# Patient Record
Sex: MALE | Race: WHITE | Employment: OTHER | URBAN - METROPOLITAN AREA
[De-identification: names, ages, dates, MRNs, and addresses within clinical notes are randomized per-mention and may not be internally consistent; named-entity substitution may affect disease eponyms.]

---

## 2017-01-11 ENCOUNTER — HOSPITAL ENCOUNTER (OUTPATIENT)
Dept: LAB | Age: 72
Discharge: HOME | End: 2017-01-11
Payer: MEDICARE

## 2017-01-11 PROCEDURE — 88304 TISSUE EXAM BY PATHOLOGIST: CPT | Performed by: PODIATRIST

## 2017-01-11 PROCEDURE — 88311 DECALCIFY TISSUE: CPT | Performed by: PODIATRIST

## 2017-03-03 ENCOUNTER — HOSPITAL ENCOUNTER (OUTPATIENT)
Dept: PHYSICAL THERAPY | Age: 72
Discharge: HOME | End: 2017-03-03
Payer: MEDICARE

## 2017-03-03 PROCEDURE — 97161 PT EVAL LOW COMPLEX 20 MIN: CPT

## 2017-03-03 PROCEDURE — G8979 MOBILITY GOAL STATUS: HCPCS

## 2017-03-03 PROCEDURE — 97110 THERAPEUTIC EXERCISES: CPT

## 2017-03-03 PROCEDURE — G8978 MOBILITY CURRENT STATUS: HCPCS

## 2017-03-03 PROCEDURE — 97140 MANUAL THERAPY 1/> REGIONS: CPT

## 2017-03-03 NOTE — PROGRESS NOTES
[facility] at [address] [place] [address]  Phone: [phone]   Fax: [phone]    Plan of Care/ Statement of Necessity for Physical Therapy Services    Patient name: Marley Chavez Start of Care: 3/3/2017   Referral source: Taylor AnilLORENADENNY : 1945    Medical Diagnosis: Foot pain, right [M79.671]   Onset Date:17 (DOS)    Treatment Diagnosis: right foot pain s/p bunionectomy and metatarsocuneiform joint fusion   Prior Hospitalization: see medical history Provider#: [id number]   Medications: Verified on Patient summary List    Comorbidities: HTN, depression, hearing impaired   Prior Level of Function: chronic foot pain with ADL's      The Plan of Care and following information is based on the information from the initial evaluation. Assessment/ key information: Pt is a 72 yo male presenting to clinic s/p foot surgery as described above. Pt currently ambulating in immobilizer boot. On exam, pt has increased right ankle/foot edema, decreased right ankle and great toe A/PROM, decreased right ankle/toe strength, decreased gastroc flexibility and decreased ambulatory status secondary to post op status.      Evaluation Complexity History LOW Complexity : Zero comorbidities / personal factors that will impact the outcome / POC; Examination HIGH Complexity : 4+ Standardized tests and measures addressing body structure, function, activity limitation and / or participation in recreation  ;Presentation LOW Complexity : Stable, uncomplicated  ;Clinical Decision Making MEDIUM Complexity : FOTO score of 26-74  Overall Complexity Rating: LOW   Problem List: pain affecting function, decrease ROM, decrease strength, edema affecting function, impaired gait/ balance, decrease ADL/ functional abilitiies, decrease activity tolerance and decrease flexibility/ joint mobility   Treatment Plan may include any combination of the following: Therapeutic exercise, Therapeutic activities, Neuromuscular re-education, Physical agent/modality, Manual therapy and Patient education  Patient / Family readiness to learn indicated by: asking questions, trying to perform skills and interest  Persons(s) to be included in education: patient (P)  Barriers to Learning/Limitations: None  Patient Goal (s): flexibility  Patient Self Reported Health Status: good  Rehabilitation Potential: good    Short Term Goals: To be accomplished in 2 weeks:  1. Patient will be independent and compliant with HEP to achieve other goals. Status at eval: not independent/compliant  2. Increase right ankle AROM by >/= 10 degrees all planes to normalize function. Status at eval DF: 4, PF: 32, inversion: 24, eversion: 10    Long Term Goals: To be accomplished in 4 weeks:  1. Improve FOTO score to >/= 59/100 to indicate decreased pain with ADL's. Status at eval: 55/100  2. Pt will transition out of boot into normal shoewear on consistent daily basis with normalizing gait. Status at eval: ambulating in immobilizer boot on consistent daily basis  3. Increase right great toe AROM flex and ext by >/= 10 degrees each to normalize gait. Status at eval: ext: 40, flex: 25    Frequency / Duration: Patient to be seen 2 times per week for 4 weeks. Patient/ Caregiver education and instruction: Diagnosis, prognosis, exercises   [x]  Plan of care has been reviewed with PTA    G-Codes (GP)  Mobility   Current  CK= 40-59%   Goal  CK= 40-59%    The severity rating is based on clinical judgment and the FOTO score. Certification Period: 3/317 - 4/1/17  Pearl Cameron, PT 3/3/2017 12:54 PM  _____________________________________________________________________  I certify that the above Therapy Services are being furnished while the patient is under my care. I agree with the treatment plan and certify that this therapy is necessary.     Physician's Signature:____________________  Date:__________Time:______    Please sign and return to   [facility] at [address] [place] [address]  Phone: [phone]   Fax: [phone]

## 2017-03-03 NOTE — PROGRESS NOTES
PT DAILY TREATMENT NOTE - [facility]     Patient Name: Leslye Medina  Date:3/3/2017  : 1945  [x]  Patient  Verified  Payor: Pallavi Parson / Plan: [place] MED[place] & B / Product Type: Medicare /    In time:110  Out time:155  Total Treatment Time (min): 45  Total Timed Codes (min): 23  1:1 Treatment Time ( [address] only): 45   Visit #: 1 of 8    Treatment Area:  Foot pain, right [M79.361]    SUBJECTIVE  Pain Level (0-10 scale): 1/10 seated at rest  Any medication changes, allergies to medications, adverse drug reactions, diagnosis change, or new procedure performed?: [x] No    [] Yes (see summary sheet for update)  Subjective functional status/changes:   [] No changes reported  See POC    OBJECTIVE    Modality rationale:    Min Type Additional Details    [] Estim:  []Unatt       []IFC  []Premod                        []Other:  []w/ice   []w/heat  Position:  Location:    [] Estim: []Att    []TENS instruct  []NMES                    []Other:  []w/US   []w/ice   []w/heat  Position:  Location:    []  Traction: [] Cervical       []Lumbar                       [] Prone          []Supine                       []Intermittent   []Continuous Lbs:  [] before manual  [] after manual    []  Ultrasound: []Continuous   [] Pulsed                           []1MHz   []3MHz W/cm2:  Location:    []  Iontophoresis with dexamethasone         Location: [] Take home patch   [] In clinic    []  Ice     []  heat  []  Ice massage  []  Laser   []  Anodyne Position:  Location:    []  Laser with stim  []  Other:  Position:  Location:    []  Vasopneumatic Device Pressure:       [] lo [] med [] hi   Temperature: [] lo [] med [] hi   [] Skin assessment post-treatment:  []intact []redness- no adverse reaction    []redness  adverse reaction:     22 min [x]Eval                  []Re-Eval       8 min Therapeutic Exercise:  [x] See flow sheet : issued and reviewed HEP   Rationale: increase ROM to improve the patients ability to in prep for ambulation out of boot     min Therapeutic Activity:  []  See flow sheet :         min Neuromuscular Re-education:  []  See flow sheet :       15 min Manual Therapy:  Retrograde massage to right ankle/foot, scar massage, PROM ankle in H/L supine   Rationale: decrease pain, increase ROM, increase tissue extensibility and decrease edema  to in prep for amb out of boot     min Gait Training:  ___ feet with ___ device on level surfaces with ___ level of assist   Rationale: With   [] TE   [] TA   [] neuro   [] other: Patient Education: [x] Review HEP    [] Progressed/Changed HEP based on:   [] positioning   [] body mechanics   [] transfers   [] heat/ice application    [] other:      Other Objective/Functional Measures:     Physical Therapy Evaluation  - Foot and Ankle  Pt s/p right bunionectomy and first metatarsocuneiform joint fusion on 1/11/17 secondary to chronic right foot pain. Pt currently ambulating WBAT right LE in immobilizer boot.   He was initially NWB x 4 weeks and then transitioned to immobolizer boot  PLOF: chronic right foot pain with ADL's  Present Functional Limitations: standing, walking, climbing stairs, driving    Gait: [] Normal    [x] Abnormal    [x] Antalgic    [] NWB    Device: immbolizer boot  Describe:    ROM/Strength  [] Unable to assess at this time      AROM        PROM            Strength (1-5)   Left Right Left Right Left  Right   Dorsiflexsion 17 4 20 10     Plantarflexsion 50 32       Inversion 50 24       Eversion 20 10       Great Toe Ext 55 40 75 55     Great Toe Flex 60 25         Flexibility: [] Unable to assess at this time  Gastroc:    (L) Tightness [] WNL   [x] Min   [x] Mod   [] Severe  10 degrees   (R) Tightness [] WNL   [] Min   [x] Mod   [x] Severe  4 degrees  Soleus:    (L) Tightness [] WNL   [] Min   [] Mod   [] Severe    (R) Tightness [] WNL   [] Min   [] Mod   [] Severe  Other:      (L) Tightness [] WNL   [] Min   [] Mod   [] Severe    (R) Tightness [] WNL   [] Min   [] Mod [] Severe    Palpation:   Location:  Patient's Pain Response: [] Min   [] Mod   [] Severe  Location:  Patient's Pain Response: [] Min   [] Mod   [] Severe    Optional Tests:  Balance/Stork Test: touches/60sec (L): (R):    Single Leg Hop:   (L) Distance(ft):  (R) Distance(ft):  (L)/(R)%:   (L) Time(sec):  (R) Time(sec): (L)/(R)%:      Sub-talor alignment: [] Neutral     [] Pronation      [] Supination    Forefoot alignment:  [] Neutral     [] Varus            [] Valgus    Swelling:   Left (cm) Right (cm)   Figure 8: 23.3 24   Midfoot:      Malleoli Level: 25 26   MTH:        Anterior Drawer: [] Neg    [] Pos  Posterior Drawer:  [] Neg    [] Pos  Inversion Stress:  [] Neg    [] Pos  Talar Tilt:   [] Neg    [] Pos  Eversion Stress:  [] Neg    [] Pos  Rubin's Sign:  [] Neg    [] Pos  Ramirez Test: [] Neg    [] Pos    Other tests/ comments:         Pain Level (0-10 scale) post treatment: 1/10    ASSESSMENT/Changes in Function: see POC    Patient will continue to benefit from skilled PT services to modify and progress therapeutic interventions, address functional mobility deficits, address ROM deficits, address strength deficits, analyze and address soft tissue restrictions, analyze and cue movement patterns and assess and modify postural abnormalities to attain remaining goals.      [x]  See Plan of Care  []  See progress note/recertification  []  See Discharge Summary         Progress towards goals / Updated goals:  See POC    PLAN  [x]  Upgrade activities as tolerated     [x]  Continue plan of care  []  Update interventions per flow sheet       []  Discharge due to:_  [x]  Other: ROM/stretching, strengthening, mod prn, manual techniques, gait      Ed Durham PT 3/3/2017  12:54 PM    Future Appointments  Date Time Provider Cyrus Hina   3/3/2017 1:00 PM Ed Durham PT [facility]

## 2017-03-07 ENCOUNTER — HOSPITAL ENCOUNTER (OUTPATIENT)
Dept: PHYSICAL THERAPY | Age: 72
Discharge: HOME | End: 2017-03-07
Payer: MEDICARE

## 2017-03-07 PROCEDURE — 97112 NEUROMUSCULAR REEDUCATION: CPT

## 2017-03-07 PROCEDURE — 97110 THERAPEUTIC EXERCISES: CPT

## 2017-03-07 PROCEDURE — 97016 VASOPNEUMATIC DEVICE THERAPY: CPT

## 2017-03-07 NOTE — PROGRESS NOTES
PT DAILY TREATMENT NOTE - [facility]     Patient Name: Rand Landa  Date:3/7/2017  : 1945  [x]  Patient  Verified  Payor: [place] MEDICARE / Plan: [place] MEDICARE PART A & B / Product Type: Medicare /    In [id number]  Out time:1020  Total Treatment Time (min): 47  Total Timed Codes (min): 37  1:1 Treatment Time ( [address] only): 30   Visit #: 2 of 8    Treatment Area: Foot pain, right [M22.201]    SUBJECTIVE  Pain Level (0-10 scale): 0/10  Any medication changes, allergies to medications, adverse drug reactions, diagnosis change, or new procedure performed?: [x] No    [] Yes (see summary sheet for update)  Subjective functional status/changes:   [] No changes reported  I have been doing contrast baths at home 3 times a day. Reviewed eval prior to tx.   OBJECTIVE    Modality rationale: decrease edema, decrease inflammation and decrease pain to improve the patients ability to improve ADL's   Min Type Additional Details    [] Estim:  []Unatt       []IFC  []Premod                        []Other:  []w/ice   []w/heat  Position:  Location:    [] Estim: []Att    []TENS instruct  []NMES                    []Other:  []w/US   []w/ice   []w/heat  Position:  Location:    []  Traction: [] Cervical       []Lumbar                       [] Prone          []Supine                       []Intermittent   []Continuous Lbs:  [] before manual  [] after manual    []  Ultrasound: []Continuous   [] Pulsed                           []1MHz   []3MHz W/cm2:  Location:    []  Iontophoresis with dexamethasone         Location: [] Take home patch   [] In clinic    []  Ice     []  heat  []  Ice massage  []  Laser   []  Anodyne Position:  Location:    []  Laser with stim  []  Other:  Position:  Location:   10 [x]  Vasopneumatic Device right foot Pressure:       [] lo [x] med [] hi   Temperature: [x] lo [] med [] hi   [x] Skin assessment post-treatment:  [x]intact []redness- no adverse reaction    []redness  adverse reaction:      min []Eval []Re-Eval       14 min Therapeutic Exercise:  [x] See flow sheet :   Rationale: increase ROM and increase strength to improve the patients ability to ready for weaning out of boot. min Therapeutic Activity:  []  See flow sheet :        13 min Neuromuscular Re-education:  [x]  See flow sheet :   Rationale: increase ROM, increase strength, improve coordination and increase proprioception  to improve the patients ability to improve ADL\"s and ready for weaning out of boot. 10 min Manual Therapy: Retrograde massage to right ankle/foot, scar massage, PROM ankle in H/L supine    Rationale: increase ROM and increase tissue extensibility to improve ADL's     min Gait Training:  ___ feet with ___ device on level surfaces with ___ level of assist   Rationale: With   [] TE   [] TA   [] neuro   [] other: Patient Education: [x] Review HEP    [] Progressed/Changed HEP based on:   [] positioning   [] body mechanics   [] transfers   [x] heat/ice application    [] other:      Other Objective/Functional Measures:      Pain Level (0-10 scale) post treatment: 0/10    ASSESSMENT/Changes in Function: No new changes. Pt wearing boot for all ambulation and utilizing compression hose for edema. Patient will continue to benefit from skilled PT services to modify and progress therapeutic interventions, address functional mobility deficits, address ROM deficits, address strength deficits, analyze and address soft tissue restrictions and analyze and cue movement patterns to attain remaining goals. [x]  See Plan of Care  []  See progress note/recertification  []  See Discharge Summary         Progress towards goals / Updated goals:  Short Term Goals: To be accomplished in 2 weeks:  1. Patient will be independent and compliant with HEP to achieve other goals. Status at eval: not independent/compliant  Current: met 3/07/17    2. Increase right ankle AROM by >/= 10 degrees all planes to normalize function.   Status at eval DF: 4, PF: 32, inversion: 24, eversion: 10  Current: no change     Long Term Goals: To be accomplished in 4 weeks:  1. Improve FOTO score to >/= 59/100 to indicate decreased pain with ADL's. Status at eval: 55/100  Current: retest every 5 visits    2. Pt will transition out of boot into normal shoewear on consistent daily basis with normalizing gait. Status at eval: ambulating in immobilizer boot on consistent daily basis  Current: wearing boot for all ambulation    3. Increase right great toe AROM flex and ext by >/= 10 degrees each to normalize gait.   Status at eval: ext: 40, flex: 25  Current: no change       PLAN  []  Upgrade activities as tolerated     [x]  Continue plan of care  []  Update interventions per flow sheet       []  Discharge due to:_  []  Other:_      Mariano Cullen PTA 3/7/2017  9:44 AM    Future Appointments  Date Time Provider Cyrus Santamaria   3/9/2017 1:30 PM SNEHA Thompson [facility]   3/14/2017 9:30 AM SNEHA Thompson [facility]   3/16/2017 9:30 AM SNEHA Thompson [facility]   3/21/2017 10:30 AM SNEHA Thompson [facility]   3/23/2017 10:00 AM IRENA Peacock [facility]   3/28/2017 9:00 AM SNEHA Thompson [facility]   3/30/2017 10:00 AM IRENA PeacockHPTSTEWART [facility]

## 2017-03-09 ENCOUNTER — HOSPITAL ENCOUNTER (OUTPATIENT)
Dept: PHYSICAL THERAPY | Age: 72
Discharge: HOME | End: 2017-03-09
Payer: MEDICARE

## 2017-03-09 PROCEDURE — 97112 NEUROMUSCULAR REEDUCATION: CPT

## 2017-03-09 PROCEDURE — 97110 THERAPEUTIC EXERCISES: CPT

## 2017-03-09 PROCEDURE — 97016 VASOPNEUMATIC DEVICE THERAPY: CPT

## 2017-03-09 PROCEDURE — 97140 MANUAL THERAPY 1/> REGIONS: CPT

## 2017-03-09 NOTE — PROGRESS NOTES
PT DAILY TREATMENT NOTE - [facility]     Patient Name: Geri Hilton  Date:3/9/2017  : 1945  [x]  Patient  Verified  Payor: Dilip Lance / Plan: [place] MEDICARE PART A & B / Product Type: Medicare /    In [id number]  Out time:1422  Total Treatment Time (min): 50  Total Timed Codes (min): 40  1:1 Treatment Time ( [address] only): 40   Visit #: 3 of 8    Treatment Area: Foot pain, right [M79.821]    SUBJECTIVE  Pain Level (0-10 scale): 0/10  Any medication changes, allergies to medications, adverse drug reactions, diagnosis change, or new procedure performed?: [x] No    [] Yes (see summary sheet for update)  Subjective functional status/changes:   [] No changes reported  Very little pain for the most part.     OBJECTIVE    Modality rationale: decrease edema, decrease inflammation and decrease pain to improve the patients ability to improve ADL's   Min Type Additional Details    [] Estim:  []Unatt       []IFC  []Premod                        []Other:  []w/ice   []w/heat  Position:  Location:    [] Estim: []Att    []TENS instruct  []NMES                    []Other:  []w/US   []w/ice   []w/heat  Position:  Location:    []  Traction: [] Cervical       []Lumbar                       [] Prone          []Supine                       []Intermittent   []Continuous Lbs:  [] before manual  [] after manual    []  Ultrasound: []Continuous   [] Pulsed                           []1MHz   []3MHz W/cm2:  Location:    []  Iontophoresis with dexamethasone         Location: [] Take home patch   [] In clinic    []  Ice     []  heat  []  Ice massage  []  Laser   []  Anodyne Position:  Location:    []  Laser with stim  []  Other:  Position:  Location:   10 [x]  Vasopneumatic Device right foot Pressure:       [] lo [x] med [] hi   Temperature: [x] lo [] med [] hi   [x] Skin assessment post-treatment:  [x]intact []redness- no adverse reaction    []redness  adverse reaction:      min []Eval                  []Re-Eval       16 min Therapeutic Exercise:  [x] See flow sheet :   Rationale: increase ROM, increase strength and improve coordination to improve the patients ability to improve gait and function     min Therapeutic Activity:  []  See flow sheet :        14 min Neuromuscular Re-education:  [x]  See flow sheet :   Rationale: increase ROM, increase strength, improve coordination and increase proprioception  to improve the patients ability to improve ADL's and ready for gait without boot. 8 min Manual Therapy:  Scar massage, PROM flexion and extension of great toe    Rationale: to improve mobility and address edema to improve gait and ADL's     min Gait Training:  ___ feet with ___ device on level surfaces with ___ level of assist   Rationale: With   [] TE   [] TA   [] neuro   [] other: Patient Education: [x] Review HEP    [] Progressed/Changed HEP based on:   [] positioning   [] body mechanics   [] transfers   [] heat/ice application    [] other:      Other Objective/Functional Measures:      Pain Level (0-10 scale) post treatment: 0/10    ASSESSMENT/Changes in Function: Edema improved today with pt wearing boot with all ambulation     Patient will continue to benefit from skilled PT services to modify and progress therapeutic interventions, address functional mobility deficits, address ROM deficits, address strength deficits, analyze and address soft tissue restrictions, analyze and cue movement patterns, analyze and modify body mechanics/ergonomics, assess and modify postural abnormalities, address imbalance/dizziness and instruct in home and community integration to attain remaining goals. [x]  See Plan of Care  []  See progress note/recertification  []  See Discharge Summary           PLAN  []  Upgrade activities as tolerated     [x]  Continue plan of care  []  Update interventions per flow sheet       []  Discharge due to:_  [x]  Other:Pt to wean off boot next week. _      Mathieu Recinos, PTA 3/9/2017  1:36 PM    Future Appointments  Date Time Provider Cyrus Santamaria   3/14/2017 9:30 AM Natalee Wright PTA [facility]   3/16/2017 9:30 AM S[facility]HA [facility]   3/21/2017 10:30 AM [facility]HA [facility]   3/23/2017 10:00 AM Marlene Pearson PT [facility]   3/28/2017 9:00 AM Natalee Wright PTA [facility]   3/30/2017 10:00 AM Marlene Pearson PT [facility]

## 2017-03-14 ENCOUNTER — HOSPITAL ENCOUNTER (OUTPATIENT)
Dept: PHYSICAL THERAPY | Age: 72
Discharge: HOME | End: 2017-03-14
Payer: MEDICARE

## 2017-03-14 PROCEDURE — 97112 NEUROMUSCULAR REEDUCATION: CPT

## 2017-03-14 PROCEDURE — 97016 VASOPNEUMATIC DEVICE THERAPY: CPT

## 2017-03-14 PROCEDURE — 97110 THERAPEUTIC EXERCISES: CPT

## 2017-03-14 NOTE — PROGRESS NOTES
PT DAILY TREATMENT NOTE - [facility]     Patient Name: Ceasar Vang  Date:3/14/2017  : 1945  [x]  Patient  Verified  Payor: [employer] / Plan: [place] / Product Type: Medicare /    In IDNE:[id number]  Out time:1018  Total Treatment Time (min): 48  Total Timed Codes (min): 38  1:1 Treatment Time ( [address] only): 38   Visit #: 4 of 8    Treatment Area: Foot pain, right [M79.331]    SUBJECTIVE  Pain Level (0-10 scale): 0/10  Any medication changes, allergies to medications, adverse drug reactions, diagnosis change, or new procedure performed?: [x] No    [] Yes (see summary sheet for update)  Subjective functional status/changes:   [] No changes reported  I am hoping to get out of the boot when I go back to the MD on Friday.     OBJECTIVE    Modality rationale: decrease edema, decrease inflammation and decrease pain to improve the patients ability to improve gait and ADL's   Min Type Additional Details    [] Estim:  []Unatt       []IFC  []Premod                        []Other:  []w/ice   []w/heat  Position:  Location:    [] Estim: []Att    []TENS instruct  []NMES                    []Other:  []w/US   []w/ice   []w/heat  Position:  Location:    []  Traction: [] Cervical       []Lumbar                       [] Prone          []Supine                       []Intermittent   []Continuous Lbs:  [] before manual  [] after manual    []  Ultrasound: []Continuous   [] Pulsed                           []1MHz   []3MHz W/cm2:  Location:    []  Iontophoresis with dexamethasone         Location: [] Take home patch   [] In clinic    []  Ice     []  heat  []  Ice massage  []  Laser   []  Anodyne Position:  Location:    []  Laser with stim  []  Other:  Position:  Location:   10 [x]  Vasopneumatic Device right foot Pressure:       [] lo [x] med [] hi   Temperature: [x] lo [] med [] hi   [x] Skin assessment post-treatment:  [x]intact []redness- no adverse reaction    []redness  adverse reaction:      min []Eval []Re-Eval       23 min Therapeutic Exercise:  [x] See flow sheet :   Rationale: increase ROM, increase strength and improve coordination to improve the patients ability to improve gait and ADL's     min Therapeutic Activity:  []  See flow sheet :        15 min Neuromuscular Re-education:  [x]  See flow sheet :   Rationale: increase ROM, increase strength, improve coordination, improve balance and increase proprioception  to improve the patients ability to improve gait and ADL's     min Manual Therapy:          min Gait Training:  ___ feet with ___ device on level surfaces with ___ level of assist   Rationale: With   [] TE   [] TA   [] neuro   [] other: Patient Education: [x] Review HEP    [] Progressed/Changed HEP based on:   [] positioning   [] body mechanics   [] transfers   [] heat/ice application    [] other:      Other Objective/Functional Measures: see goal review     Pain Level (0-10 scale) post treatment: >1/10    ASSESSMENT/Changes in Function: Pt continues ambulation wearing boot and compression hose and is challenged with toe therex    Patient will continue to benefit from skilled PT services to modify and progress therapeutic interventions, address functional mobility deficits, address ROM deficits, address strength deficits, analyze and address soft tissue restrictions, analyze and cue movement patterns, analyze and modify body mechanics/ergonomics and assess and modify postural abnormalities to attain remaining goals. [x]  See Plan of Care  []  See progress note/recertification  []  See Discharge Summary         Progress towards goals / Updated goals:  Short Term Goals: To be accomplished in 2 weeks:  1. Patient will be independent and compliant with HEP to achieve other goals. Status at eval: not independent/compliant  Current: met 3/07/17     2. Increase right ankle AROM by >/= 10 degrees all planes to normalize function.   Status at eval DF: 4, PF: 32, inversion: 24, eversion: 10  Current: DF:18, PF:13, INV:10,  EV: 18      Long Term Goals: To be accomplished in 4 weeks:  1. Improve FOTO score to >/= 59/100 to indicate decreased pain with ADL's. Status at eval: 55/100  Current: retest next visit     2. Pt will transition out of boot into normal shoewear on consistent daily basis with normalizing gait. Status at eval: ambulating in immobilizer boot on consistent daily basis  Current: wearing boot for all ambulation; awaiting MD apt on Friday to discharge boot     3. Increase right great toe AROM flex and ext by >/= 10 degrees each to normalize gait.   Status at eval: ext: 40, flex: 25  Current: no change        PLAN  []  Upgrade activities as tolerated     [x]  Continue plan of care  []  Update interventions per flow sheet       []  Discharge due to:_  [x]  Other:_Retest FOTO next visit      Marilia Shelton PTA 3/14/2017  9:44 AM    Future Appointments  Date Time Provider Cyrus Santamaria   3/16/2017 9:30 AM SNEHA Blanchard [facility]   3/21/2017 10:30 AM SNEHA Blanchard [facility]   3/23/2017 10:00 AM IRENA Vences THE [facility]   3/28/2017 9:00 AM SNEHA Blanchard THE [facility]   3/30/2017 10:00 AM IRENA Vences THE [facility]

## 2017-03-16 ENCOUNTER — HOSPITAL ENCOUNTER (OUTPATIENT)
Dept: PHYSICAL THERAPY | Age: 72
Discharge: HOME | End: 2017-03-16
Payer: MEDICARE

## 2017-03-16 PROCEDURE — 97112 NEUROMUSCULAR REEDUCATION: CPT

## 2017-03-16 PROCEDURE — 97110 THERAPEUTIC EXERCISES: CPT

## 2017-03-16 PROCEDURE — 97016 VASOPNEUMATIC DEVICE THERAPY: CPT

## 2017-03-16 NOTE — PROGRESS NOTES
PT DAILY TREATMENT NOTE - [facility]     Patient Name: Digna Gonzalez  Date:3/16/2017  : 1945  [x]  Patient  Verified  Payor: Maggie Trammell / Plan: [place] MED[place] PART [place] & [place] / Product Type: Medicare /    In HHX  Out time:1015  Total Treatment Time (min): 50  Total Timed Codes (min): 40  1:1 Treatment Time ([facility] only): 40   Visit #: 5 of 8    Treatment Area: Foot pain, right [M79.071]    SUBJECTIVE  Pain Level (0-10 scale): 0/10  Any medication changes, allergies to medications, adverse drug reactions, diagnosis change, or new procedure performed?: [x] No    [] Yes (see summary sheet for update)  Subjective functional status/changes:   [] No changes reported  I should get the boot off on Tuesday when I follow up with the MD. Today is the first day that I walked here from home. I am doing my toe exercises.     OBJECTIVE    Modality rationale: decrease edema, decrease inflammation and decrease pain to improve the patients ability to improve ADL's   Min Type Additional Details    [] Estim:  []Unatt       []IFC  []Premod                        []Other:  []w/ice   []w/heat  Position:  Location:    [] Estim: []Att    []TENS instruct  []NMES                    []Other:  []w/US   []w/ice   []w/heat  Position:  Location:    []  Traction: [] Cervical       []Lumbar                       [] Prone          []Supine                       []Intermittent   []Continuous Lbs:  [] before manual  [] after manual    []  Ultrasound: []Continuous   [] Pulsed                           []1MHz   []3MHz W/cm2:  Location:    []  Iontophoresis with dexamethasone         Location: [] Take home patch   [] In clinic    []  Ice     []  heat  []  Ice massage  []  Laser   []  Anodyne Position:  Location:    []  Laser with stim  []  Other:  Position:  Location:   10 [x]  Vasopneumatic Device right foot Pressure:       [] lo [] med [] hi   Temperature: [] lo [] med [] hi   [] Skin assessment post-treatment:  []intact []redness- no adverse reaction    []redness  adverse reaction:      min []Eval                  []Re-Eval       23 min Therapeutic Exercise:  [x] See flow sheet :   Rationale: increase ROM, increase strength and improve coordination to improve the patients ability to improve foot mobility and ready for weaning off of immobilizer boot. min Therapeutic Activity:  []  See flow sheet :        17 min Neuromuscular Re-education:  [x]  See flow sheet :added rolling PF of right foot with flexbar   Rationale: increase ROM, increase strength and improve coordination  to improve the patients ability to improve gait and ADL's and prep for weaning off of immobilizer boot. min Manual Therapy:          min Gait Training:  ___ feet with ___ device on level surfaces with ___ level of assist   Rationale: With   [] TE   [] TA   [] neuro   [] other: Patient Education: [x] Review HEP    [] Progressed/Changed HEP based on:   [] positioning   [] body mechanics   [] transfers   [] heat/ice application    [] other:      Other Objective/Functional Measures: FOTO: 18     Pain Level (0-10 scale) post treatment: 0/10    ASSESSMENT/Changes in Function: Pt continues to be challenged with isolating toe mobility on right foot. Edema is improving with contrast baths at home. Patient will continue to benefit from skilled PT services to modify and progress therapeutic interventions, address functional mobility deficits, address ROM deficits, address strength deficits and analyze and address soft tissue restrictions to attain remaining goals.      [x]  See Plan of Care  []  See progress note/recertification  []  See Discharge Summary           PLAN  [x]  Upgrade activities as tolerated     [x]  Continue plan of care  []  Update interventions per flow sheet       []  Discharge due to:_  []  Other:_      Mahamed Aguirre PTA 3/16/2017  9:42 AM    Future Appointments  Date Time Provider Cyrus Santamaria   3/21/2017 10:30 AM Mahamed Aguirre PTA [facility] 3/23/2017 10:00 AM Jami Delatorre PT [facility]   3/28/2017 9:00 AM SNEHA Hu[facility]PTSTEWART THE [facility]   3/30/2017 10:00 AM Jami Delatorre PT [facility]

## 2017-03-21 ENCOUNTER — HOSPITAL ENCOUNTER (OUTPATIENT)
Dept: PHYSICAL THERAPY | Age: 72
Discharge: HOME | End: 2017-03-21
Payer: MEDICARE

## 2017-03-21 PROCEDURE — 97110 THERAPEUTIC EXERCISES: CPT

## 2017-03-21 PROCEDURE — 97112 NEUROMUSCULAR REEDUCATION: CPT

## 2017-03-21 PROCEDURE — 97016 VASOPNEUMATIC DEVICE THERAPY: CPT

## 2017-03-21 NOTE — PROGRESS NOTES
PT DAILY TREATMENT NOTE - [facility]     Patient Name: Chanel Green  Date:3/21/2017  : 1945  [x]  Patient  Verified  Payor: Julien Freire / Plan: [place] MEDICARE PART A & B / Product Type: Medicare /    In time:1030  Out time:1108  Total Treatment Time (min): 38  Total Timed Codes (min): 28  1:1 Treatment Time ( [address] only): 28   Visit #: 6 of 8    Treatment Area: Foot pain, right [M79.811]    SUBJECTIVE  Pain Level (0-10 scale): 0/10  Any medication changes, allergies to medications, adverse drug reactions, diagnosis change, or new procedure performed?: [x] No    [] Yes (see summary sheet for update)  Subjective functional status/changes:   [] No changes reported  I see the MD today and hope I get out of the boot.     OBJECTIVE    Modality rationale: decrease edema, decrease inflammation and decrease pain to improve the patients ability to improve ADL's and ready for boot discharge   Min Type Additional Details    [] Estim:  []Unatt       []IFC  []Premod                        []Other:  []w/ice   []w/heat  Position:  Location:    [] Estim: []Att    []TENS instruct  []NMES                    []Other:  []w/US   []w/ice   []w/heat  Position:  Location:    []  Traction: [] Cervical       []Lumbar                       [] Prone          []Supine                       []Intermittent   []Continuous Lbs:  [] before manual  [] after manual    []  Ultrasound: []Continuous   [] Pulsed                           []1MHz   []3MHz W/cm2:  Location:    []  Iontophoresis with dexamethasone         Location: [] Take home patch   [] In clinic    []  Ice     []  heat  []  Ice massage  []  Laser   []  Anodyne Position:  Location:    []  Laser with stim  []  Other:  Position:  Location:   10 [x]  Vasopneumatic Device right foot Pressure:       [] lo [x] med [] hi   Temperature: [x] lo [] med [] hi   [x] Skin assessment post-treatment:  [x]intact []redness- no adverse reaction    []redness  adverse reaction:      min []Eval []Re-Eval       15 min Therapeutic Exercise:  [x] See flow sheet :   Rationale: increase ROM, increase strength and improve coordination to improve the patients ability to improve foot mobility and ready for weaning off of immobilizer boot.        min Therapeutic Activity:  []  See flow sheet :        13 min Neuromuscular Re-education:  [x]  See flow sheet :   Rationale: increase ROM, increase strength, improve coordination and increase proprioception  to improve the patients ability to improve gait and ADL's     min Manual Therapy:          min Gait Training:  ___ feet with ___ device on level surfaces with ___ level of assist   Rationale: With   [] TE   [] TA   [] neuro   [] other: Patient Education: [x] Review HEP    [] Progressed/Changed HEP based on:   [] positioning   [] body mechanics   [] transfers   [] heat/ice application    [] other:      Other Objective/Functional Measures: see goal review     Pain Level (0-10 scale) post treatment: 0/10    ASSESSMENT/Changes in Function: Pt ambulating using ambulatory boot and awaiting discharge of boot today. Patient will continue to benefit from skilled PT services to modify and progress therapeutic interventions, address functional mobility deficits, address ROM deficits, address strength deficits, analyze and address soft tissue restrictions, analyze and cue movement patterns, analyze and modify body mechanics/ergonomics and instruct in home and community integration to attain remaining goals. [x]  See Plan of Care  []  See progress note/recertification  []  See Discharge Summary         Progress towards goals / Updated goals:  Short Term Goals: To be accomplished in 2 weeks:  1. Patient will be independent and compliant with HEP to achieve other goals. Status at eval: not independent/compliant  Current: met 3/07/17      2. Increase right ankle AROM by >/= 10 degrees all planes to normalize function.   Status at eval DF: 4, PF: 32, inversion: 24, eversion: 10  Current: DF:18, PF:13, INV:10, EV: 18      Long Term Goals: To be accomplished in 4 weeks:  1. Improve FOTO score to >/= 59/100 to indicate decreased pain with ADL's. Status at eval: 55/100  Current: 73/100      2. Pt will transition out of boot into normal shoewear on consistent daily basis with normalizing gait. Status at eval: ambulating in immobilizer boot on consistent daily basis  Current: wearing boot for all ambulation; awaiting MD appt to discharge boot today      3. Increase right great toe AROM flex and ext by >/= 10 degrees each to normalize gait.   Status at eval: ext: 40, flex: 25  Current: no change        PLAN  [x]  Upgrade activities as tolerated     [x]  Continue plan of care  []  Update interventions per flow sheet       []  Discharge due to:_  []  Other:_      Prerna Gore PTA 3/21/2017  10:40 AM    Future Appointments  Date Time Provider Cyrus Santamaria   3/23/2017 10:00 AM IRENA Harper THE [facility]   3/28/2017 9:00 AM SNEHA Aguiar THE [facility]   3/30/2017 10:00 AM IRENA Harper THE [facility]

## 2017-03-23 ENCOUNTER — HOSPITAL ENCOUNTER (OUTPATIENT)
Dept: PHYSICAL THERAPY | Age: 72
Discharge: HOME | End: 2017-03-23
Payer: MEDICARE

## 2017-03-23 PROCEDURE — 97016 VASOPNEUMATIC DEVICE THERAPY: CPT

## 2017-03-23 PROCEDURE — 97110 THERAPEUTIC EXERCISES: CPT

## 2017-03-23 NOTE — PROGRESS NOTES
PT DAILY TREATMENT NOTE - [facility]     Patient Name: Kerrie Conteh  Date:3/23/2017  : 1945  [x]  Patient  Verified  Payor: Patricia Melgoza / Plan: [place] MEDICARE PART A & B / Product Type: Medicare /    In time:950  Out time:1030  Total Treatment Time (min): 40  Total Timed Codes (min): 30  1:1 Treatment Time ([address] only): 30   Visit #: 7 of 8    Treatment Area: Foot pain, right [M79.541]    SUBJECTIVE  Pain Level (0-10 scale): 0/10  Any medication changes, allergies to medications, adverse drug reactions, diagnosis change, or new procedure performed?: [x] No    [] Yes (see summary sheet for update)  Subjective functional status/changes:   [] No changes reported  I saw the doctor and he said things were good. Too another xray and wants me to massage the bunion area to wear that down. He said I can bee out of the boot!     OBJECTIVE    Modality rationale: decrease edema, decrease inflammation and decrease pain to improve the patients ability to increase activity/position tolerance   Min Type Additional Details    [] Estim:  []Unatt       []IFC  []Premod                        []Other:  []w/ice   []w/heat  Position:  Location:    [] Estim: []Att    []TENS instruct  []NMES                    []Other:  []w/US   []w/ice   []w/heat  Position:  Location:    []  Traction: [] Cervical       []Lumbar                       [] Prone          []Supine                       []Intermittent   []Continuous Lbs:  [] before manual  [] after manual    []  Ultrasound: []Continuous   [] Pulsed                           []1MHz   []3MHz W/cm2:  Location:    []  Iontophoresis with dexamethasone         Location: [] Take home patch   [] In clinic    []  Ice     []  heat  []  Ice massage  []  Laser   []  Anodyne Position:  Location:    []  Laser with stim  []  Other:  Position:  Location:   10 [x]  Vasopneumatic Device Pressure:       [] lo [x] med [] hi   Temperature: [x] lo [] med [] hi   [] Skin assessment post-treatment:  []intact []redness- no adverse reaction    []redness  adverse reaction:      min []Eval                  []Re-Eval       30 min Therapeutic Exercise:  [x] See flow sheet : added marching, HR/TR and modified gastroc stretch to standing   Rationale: increase ROM, increase strength and increase proprioception to improve the patients ability to normalize gait and function     min Therapeutic Activity:  []  See flow sheet :         min Neuromuscular Re-education:  []  See flow sheet :        min Manual Therapy:         min Gait Training:  ___ feet with ___ device on level surfaces with ___ level of assist   Rationale: With   [] TE   [] TA   [] neuro   [] other: Patient Education: [x] Review HEP    [] Progressed/Changed HEP based on:   [] positioning   [] body mechanics   [] transfers   [] heat/ice application    [] other:      Other Objective/Functional Measures: see goal review     Pain Level (0-10 scale) post treatment: 0/10    ASSESSMENT/Changes in Function: Pt presents to clinic ambulating in normal shoewear after seeing MD 2 days ago. Patient will continue to benefit from skilled PT services to modify and progress therapeutic interventions, address functional mobility deficits, address ROM deficits, address strength deficits, analyze and address soft tissue restrictions, analyze and cue movement patterns and assess and modify postural abnormalities to attain remaining goals. []  See Plan of Care  []  See progress note/recertification  []  See Discharge Summary         Progress towards goals / Updated goals:  Short Term Goals: To be accomplished in 2 weeks:  1. Patient will be independent and compliant with HEP to achieve other goals. Status at eval: not independent/compliant  Current: met 3/07/17, updated with [employer]       2. Increase right ankle AROM by >/= 10 degrees all planes to normalize function.   Status at eval DF: 4, PF: 32, inversion: 24, eversion: 10  Current: DF:18, PF:13, INV:10, EV: Renea Mejía  Term Goals: To be accomplished in 4 weeks:  1. Improve FOTO score to >/= 59/100 to indicate decreased pain with ADL's. Status at eval: 55/100  Current: met: 73/100, retest visit 10      2. Pt will transition out of boot into normal shoewear on consistent daily basis with normalizing gait. Status at eval: ambulating in immobilizer boot on consistent daily basis  Current: met: pt ambulating in normal shoewear with normalizing gait      3. Increase right great toe AROM flex and ext by >/= 10 degrees each to normalize gait.   Status at eval: ext: 40, flex: 25  Current: progressing    PLAN  [x]  Upgrade activities as tolerated     [x]  Continue plan of care  []  Update interventions per flow sheet       []  Discharge due to:_  []  Other:_      Mary Madden, PT 3/23/2017  10:08 AM    Future Appointments  Date Time Provider Cyrus Santamaria   3/28/2017 9:00 AM Vianney Reyes, PTA [facility]   3/30/2017 10:00 AM Mary Madden, PT MALIK THE [facility]

## 2017-03-28 ENCOUNTER — HOSPITAL ENCOUNTER (OUTPATIENT)
Dept: PHYSICAL THERAPY | Age: 72
Discharge: HOME | End: 2017-03-28
Payer: MEDICARE

## 2017-03-28 PROCEDURE — 97016 VASOPNEUMATIC DEVICE THERAPY: CPT

## 2017-03-28 PROCEDURE — 97112 NEUROMUSCULAR REEDUCATION: CPT

## 2017-03-28 PROCEDURE — 97110 THERAPEUTIC EXERCISES: CPT

## 2017-03-30 ENCOUNTER — HOSPITAL ENCOUNTER (OUTPATIENT)
Dept: PHYSICAL THERAPY | Age: 72
Discharge: HOME | End: 2017-03-30
Payer: MEDICARE

## 2017-03-30 PROCEDURE — 97016 VASOPNEUMATIC DEVICE THERAPY: CPT

## 2017-03-30 PROCEDURE — G8979 MOBILITY GOAL STATUS: HCPCS

## 2017-03-30 PROCEDURE — G8978 MOBILITY CURRENT STATUS: HCPCS

## 2017-03-30 PROCEDURE — 97110 THERAPEUTIC EXERCISES: CPT

## 2017-03-30 NOTE — PROGRESS NOTES
PT DAILY TREATMENT NOTE - [facility]     Patient Name: Shamir White  Date:3/30/2017  : 1945  [x]  Patient  Verified  Payor: Handy Micah / Plan: [place] MEDICARE PART A & B / Product Type: Medicare /    In time:945  Out time:1025  Total Treatment Time (min): 40  Total Timed Codes (min): 30  1:1 Treatment Time ( [address] only): 30   Visit #: 9 of 16    Treatment Area:  Foot pain, right [M79.101]    SUBJECTIVE  Pain Level (0-10 scale): 1-2/10  Any medication changes, allergies to medications, adverse drug reactions, diagnosis change, or new procedure performed?: [x] No    [] Yes (see summary sheet for update)  Subjective functional status/changes:   [x] No changes reported      OBJECTIVE    Modality rationale: decrease edema, decrease inflammation and decrease pain to improve the patients ability to increase activity/position tolerance   Min Type Additional Details    [] Estim:  []Unatt       []IFC  []Premod                        []Other:  []w/ice   []w/heat  Position:  Location:    [] Estim: []Att    []TENS instruct  []NMES                    []Other:  []w/US   []w/ice   []w/heat  Position:  Location:    []  Traction: [] Cervical       []Lumbar                       [] Prone          []Supine                       []Intermittent   []Continuous Lbs:  [] before manual  [] after manual    []  Ultrasound: []Continuous   [] Pulsed                           []1MHz   []3MHz W/cm2:  Location:    []  Iontophoresis with dexamethasone         Location: [] Take home patch   [] In clinic    []  Ice     []  heat  []  Ice massage  []  Laser   []  Anodyne Position:  Location:    []  Laser with stim  []  Other:  Position:  Location:   10 [x]  Vasopneumatic Device Pressure:       [] lo [x] med [] hi   Temperature: [x] lo [] med [] hi   [] Skin assessment post-treatment:  []intact []redness- no adverse reaction    []redness  adverse reaction:      min []Eval                  []Re-Eval       30 min Therapeutic Exercise:  [x] See flow sheet :   Rationale: increase ROM, increase strength, improve balance and increase proprioception to improve the patients ability to normalize gait and function     min Therapeutic Activity:  []  See flow sheet :         min Neuromuscular Re-education:  []  See flow sheet :        min Manual Therapy:          min Gait Training:  ___ feet with ___ device on level surfaces with ___ level of assist   Rationale: With   [] TE   [] TA   [] neuro   [] other: Patient Education: [x] Review HEP    [] Progressed/Changed HEP based on:   [] positioning   [] body mechanics   [] transfers   [] heat/ice application    [] other:      Other Objective/Functional Measures: none taken today     Pain Level (0-10 scale) post treatment: 0/10    ASSESSMENT/Changes in Function: No new progress. Patient will continue to benefit from skilled PT services to modify and progress therapeutic interventions, address ROM deficits, address strength deficits, analyze and address soft tissue restrictions, analyze and cue movement patterns and assess and modify postural abnormalities to attain remaining goals.      []  See Plan of Care  [x]  See progress note/recertification  []  See Discharge Summary           PLAN  [x]  Upgrade activities as tolerated     [x]  Continue plan of care  []  Update interventions per flow sheet       []  Discharge due to:_  []  Other:_      Boby Iglesias, PT 3/30/2017  9:53 AM    Future Appointments  Date Time Provider Cyrus Santamaria   3/30/2017 10:00 AM Mary Carmen Pollack THE [facility]

## 2017-03-30 NOTE — PROGRESS NOTES
[facility] at [address] [place] [address]  Phone: [phone]   Fax: [phone]    Continued Plan of Care/ Re-certification for Physical Therapy Services    Patient name: Ricarda Carreno Start of Care: 3/3/17   Referral source: Chris Mae DPM : 1945   Medical/Treatment Diagnosis: Foot pain, right [M79.671] Onset Date:17 (DOS)     Prior Hospitalization: see medical history Provider#: [id number]   Medications: Verified on Patient Summary List    Comorbidities: HTN, depression, hearing impaired  Prior Level of Function:chronic foot pain with ADL's    Visits from Start of Care: 8    Missed Visits: 0    The Plan of Care and following information is based on the patient's current status:  Short Term Goals: To be accomplished in 2 weeks:  1. Patient will be independent and compliant with HEP to achieve other goals. Status at eval: not independent/compliant  Current: met 3/07/17, updated with Tband       2. Increase right ankle AROM by >/= 10 degrees all planes to normalize function. Status at eval DF: 4, PF: 32, inversion: 24, eversion: 10  Current: DF:18, PF:13, INV:10, EV: 18      Long Term Goals: To be accomplished in 4 weeks:  1. Improve FOTO score to >/= 59/100 to indicate decreased pain with ADL's. Status at eval: 55/100  Current: met: 73/100, retest visit 10      2. Pt will transition out of boot into normal shoewear on consistent daily basis with normalizing gait. Status at eval: ambulating in immobilizer boot on consistent daily basis  Current: met: pt ambulating in normal shoewear with normalizing gait      3. Increase right great toe AROM flex and ext by >/= 10 degrees each to normalize gait. Status at eval: ext: 40, flex: 25  Current: progressing    Key functional changes: Pt has transitioned to ambulating in normal shoewear with manageable symptoms.       Problems/ barriers to goal attainment: none     Problem List: pain affecting function, decrease ROM, decrease strength, edema affecting function, impaired gait/ balance, decrease ADL/ functional abilitiies, decrease activity tolerance and decrease flexibility/ joint mobility    Treatment Plan: Therapeutic exercise, Therapeutic activities, Neuromuscular re-education, Physical agent/modality, Gait/balance training, Manual therapy, Aquatic therapy and Patient education     Patient Goal (s) has been updated and includes: \"less swelling and more movement\"     Goals for this certification period to be accomplished in 4 weeks:  Continue with unmet goals above. New Goal: Increase right ankle strength >/= 4+/5 to normalize function. Current Status: not assessed    Frequency / Duration: Patient to be seen 2 times per week for 4 weeks:    Assessment / Recommendations:Gait normalizing with pt out of ambulatory boot with minimal edema at right Achilles. Pt would benefit from additional PT intervention to continue to address ROM and edema and progress dynamic balance and ankle stability to normalize function. G-Codes (GP)  Mobility  [id number] Current  CJ= 20-39%  [id number] Goal  CK= 40-59%    The severity rating is based on clinical judgment and the FOTO score. Certification Period: 3/31/17 - 4/29/17    Etta Young, PT 3/30/2017 9:54 AM    ________________________________________________________________________  I certify that the above Therapy Services are being furnished while the patient is under my care. I agree with the treatment plan and certify that this therapy is necessary. [] I have read the above and request that my patient continue as recommended.   [] I have read the above report and request that my patient continue therapy with the following changes/special instructions: ______________________________________  [] I have read the above report and request that my patient be discharged from therapy    Physician's Signature:_______________________________Date:___________Time:__________    Please sign and return to   [facility] at [address] [place] [address]     Phone: [phone]   Fax: [phone]

## 2017-04-03 ENCOUNTER — HOSPITAL ENCOUNTER (OUTPATIENT)
Dept: PHYSICAL THERAPY | Age: 72
Discharge: HOME | End: 2017-04-03
Payer: MEDICARE

## 2017-04-03 NOTE — PROGRESS NOTES
PT DAILY TREATMENT NOTE - [facility]     Patient Name: Nicholas Escamilla  Date:4/3/2017  : 1945  [x]  Patient  Verified  Payor: Jairo Loo / Plan: [place] MED[place] PART [place] & [place] / Product Type: Medicare /    In time:130  Out time:215  Total Treatment Time (min): 45  Total Timed Codes (min): 45  1:1 Treatment Time ([facility] only): 45   Visit #: 10 of 18    Treatment Area: Foot pain, right [M79.671]    SUBJECTIVE  Pain Level (0-10 scale): 1/10  Any medication changes, allergies to medications, adverse drug reactions, diagnosis change, or new procedure performed?: [x] No    [] Yes (see summary sheet for update)  Subjective functional status/changes:   [] No changes reported  Pt reports that he has been noticing continued pain in his toe, especially since trying to increase his activity level.     OBJECTIVE    Modality rationale: decrease inflammation and decrease pain to improve the patients ability to complete functional tasks absent of pain   Min Type Additional Details    [] Estim:  []Unatt       []IFC  []Premod                        []Other:  []w/ice   []w/heat  Position:  Location:    [] Estim: []Att    []TENS instruct  []NMES                    []Other:  []w/US   []w/ice   []w/heat  Position:  Location:    []  Traction: [] Cervical       []Lumbar                       [] Prone          []Supine                       []Intermittent   []Continuous Lbs:  [] before manual  [] after manual    []  Ultrasound: []Continuous   [] Pulsed                           []1MHz   []3MHz W/cm2:  Location:    []  Iontophoresis with dexamethasone         Location: [] Take home patch   [] In clinic    []  Ice     []  heat  []  Ice massage  []  Laser   []  Anodyne Position:  Location:    []  Laser with stim  []  Other:  Position:  Location:   10 [x]  Vasopneumatic Device Pressure:       [] lo [x] med [] hi   Temperature: [] lo [x] med [] hi   [x] Skin assessment post-treatment:  [x]intact [x]redness- no adverse reaction    []redness  adverse reaction:       25 min Neuromuscular Re-education:  [x]  See flow sheet :   Rationale: increase ROM and increase strength  to improve the patients ability to complete functional tasks safely    10 min Manual Therapy:  MFR to right toes, myofascial toe pulls, gentle talocrural mobes   Rationale: decrease pain, increase ROM, increase tissue extensibility and decrease edema  to complete functional tasks without pain          With   [] TE   [] TA   [] neuro   [] other: Patient Education: [x] Review HEP    [] Progressed/Changed HEP based on:   [] positioning   [] body mechanics   [] transfers   [] heat/ice application    [] other:      Other Objective/Functional Measures:      Pain Level (0-10 scale) post treatment:0/10    ASSESSMENT/Changes in Function: Pt demonstrates limited mobility through right big toe contributing to ongoing pain as well as stiffness into mid foot. Pt demonstrated improved mobility following treatment and was able to complete marble activity as well. Pt continues to have limited ankle and hip balance and will benefit from continued balance work    Patient will continue to benefit from skilled PT services to address functional mobility deficits, address ROM deficits, address strength deficits and analyze and address soft tissue restrictions to attain remaining goals. []  See Plan of Care  []  See progress note/recertification  []  See Discharge Summary         Progress towards goals / Updated goals:  Short Term Goals: To be accomplished in 2 weeks:  1. Patient will be independent and compliant with HEP to achieve other goals. Status at eval: not independent/compliant  Current: met 3/07/17, updated with Tband       2. Increase right ankle AROM by >/= 10 degrees all planes to normalize function. Status at eval DF: 4, PF: 32, inversion: 24, eversion: 10  Current: DF:18, PF:13, INV:10, EV: 18      Long Term Goals: To be accomplished in 4 weeks:  1.  Improve FOTO score to >/= 59/100 to indicate decreased pain with ADL's. Status at eval: 55/100  Current: met: 70/100, reduced since visit 10.      2. Pt will transition out of boot into normal shoewear on consistent daily basis with normalizing gait. Status at eval: ambulating in immobilizer boot on consistent daily basis  Current: met: pt ambulating in normal shoewear with normalizing gait      3. Increase right great toe AROM flex and ext by >/= 10 degrees each to normalize gait.   Status at eval: ext: 40, flex: 25  Current: progressing    PLAN  []  Upgrade activities as tolerated     [x]  Continue plan of care  []  Update interventions per flow sheet       []  Discharge due to:_  []  Other:_      Abdirashid Tripp PTA 4/3/2017  2:07 PM    Future Appointments  Date Time Provider Cyrus Santamaria   4/4/2017 9:30 AM SNEHA Hernandez [facility]   4/10/2017 9:00 AM SNEHA Izaguirre [facility]   4/13/2017 9:30 AM SNEHA Hernandez [facility]

## 2017-04-04 ENCOUNTER — HOSPITAL ENCOUNTER (OUTPATIENT)
Dept: PHYSICAL THERAPY | Age: 72
Discharge: HOME | End: 2017-04-04
Payer: MEDICARE

## 2017-04-04 PROCEDURE — 97016 VASOPNEUMATIC DEVICE THERAPY: CPT

## 2017-04-04 PROCEDURE — 97112 NEUROMUSCULAR REEDUCATION: CPT

## 2017-04-04 PROCEDURE — 97110 THERAPEUTIC EXERCISES: CPT

## 2017-04-04 NOTE — PROGRESS NOTES
PT DAILY TREATMENT NOTE - [facility]     Patient Name: Cristina Daniel  Date:2017  : 1945  [x]  Patient  Verified  Payor: Lakhwinder Aguilera / Plan: [place] MEDICARE PART A & B / Product Type: Medicare /    In WWTM:8294  Out time:1025  Total Treatment Time (min): 55  Total Timed Codes (min): 45  1:1 Treatment Time ( [address] only): 33   Visit #: 11 of 18    Treatment Area: Foot pain, right [M79.361]    SUBJECTIVE  Pain Level (0-10 scale): 0/10  Any medication changes, allergies to medications, adverse drug reactions, diagnosis change, or new procedure performed?: [x] No    [] Yes (see summary sheet for update)  Subjective functional status/changes:   [] No changes reported  I walked here this morning - about 3/10 of a mile.     OBJECTIVE    Modality rationale: decrease edema, decrease inflammation and decrease pain to improve the patients ability to improve gait and ADL's   Min Type Additional Details    [] Estim:  []Unatt       []IFC  []Premod                        []Other:  []w/ice   []w/heat  Position:  Location:    [] Estim: []Att    []TENS instruct  []NMES                    []Other:  []w/US   []w/ice   []w/heat  Position:  Location:    []  Traction: [] Cervical       []Lumbar                       [] Prone          []Supine                       []Intermittent   []Continuous Lbs:  [] before manual  [] after manual    []  Ultrasound: []Continuous   [] Pulsed                           []1MHz   []3MHz W/cm2:  Location:    []  Iontophoresis with dexamethasone         Location: [] Take home patch   [] In clinic    []  Ice     []  heat  []  Ice massage  []  Laser   []  Anodyne Position:  Location:    []  Laser with stim  []  Other:  Position:  Location:   10 _  Vasopneumatic Device right foot/ankle  Pressure:       [] lo [x] med [] hi   Temperature: [x] lo [] med [] hi   [x] Skin assessment post-treatment:  [x]intact []redness- no adverse reaction    []redness  adverse reaction:      min []Eval                  []Re-Eval 31 min Therapeutic Exercise:  [x] See flow sheet :   Rationale: increase ROM, increase strength and improve coordination to improve the patients ability to improve gait and function     min Therapeutic Activity:  []  See flow sheet :        14 min Neuromuscular Re-education:  [x]  See flow sheet :static and dynamic balance with stepping over hurdles added   Rationale: increase strength, improve coordination, improve balance and increase proprioception  to improve the patients ability to improve balance and safety with gait     min Manual Therapy:          min Gait Training:  ___ feet with ___ device on level surfaces with ___ level of assist   Rationale: With   [] TE   [] TA   [] neuro   [] other: Patient Education: [x] Review HEP    [] Progressed/Changed HEP based on:   [] positioning   [] body mechanics   [] transfers   [] heat/ice application    [] other:      Other Objective/Functional Measures:      Pain Level (0-10 scale) post treatment: 0/10    ASSESSMENT/Changes in Function: Pt tends to lean to right when balancing and stepping over obstacles. Patient will continue to benefit from skilled PT services to modify and progress therapeutic interventions, address functional mobility deficits, address ROM deficits, address strength deficits, analyze and address soft tissue restrictions, analyze and cue movement patterns, analyze and modify body mechanics/ergonomics, assess and modify postural abnormalities, address imbalance/dizziness and instruct in home and community integration to attain remaining goals. []  See Plan of Care  [x]  See progress note/recertification  []  See Discharge Summary         Progress towards goals / Updated goals: Increase right ankle AROM by >/= 10 degrees all planes to normalize function.   Status at eval DF: 4, PF: 32, inversion: 24, eversion: 10  Status at [facility]:DF:18, PF:13, INV:10, EV: 18  Current: no change      Increase right great toe AROM flex and ext by >/= 10 degrees each to normalize gait. Status at eval: ext: 40, flex: 25  Status at [facility]: progressing  Current: progressing    New Goal: Increase right ankle strength >/= 4+/5 to normalize function.   Current Status: not assessed       PLAN  [x]  Upgrade activities as tolerated     [x]  Continue plan of care  []  Update interventions per flow sheet       []  Discharge due to:_  []  Other:_      Eduardo Galan PTA 4/4/2017  9:22 AM    Future Appointments  Date Time Provider Cyrus Santamaria   4/4/2017 9:30 AM SNEHA Chavarria [facility]   4/10/2017 9:00 AM SNEHA Dewitt [facility]   4/13/2017 9:30 AM SNEHA Chavarria [facility]

## 2017-04-10 ENCOUNTER — HOSPITAL ENCOUNTER (OUTPATIENT)
Dept: PHYSICAL THERAPY | Age: 72
Discharge: HOME | End: 2017-04-10
Payer: MEDICARE

## 2017-04-10 PROCEDURE — 97112 NEUROMUSCULAR REEDUCATION: CPT

## 2017-04-10 PROCEDURE — 97016 VASOPNEUMATIC DEVICE THERAPY: CPT

## 2017-04-10 PROCEDURE — 97140 MANUAL THERAPY 1/> REGIONS: CPT

## 2017-04-10 NOTE — PROGRESS NOTES
PT DAILY TREATMENT NOTE - [facility]     Patient Name: Lorena Fischer  Date:4/10/2017  : 1945  [x]  Patient  Verified  Payor: Gerry Hdez / Plan: [place] MEDICARE PART A & B / Product Type: Medicare /    In time:900  Out time:950  Total Treatment Time (min): 50  Total Timed Codes (min): 50  1:1 Treatment Time ( [address] only): 50   Visit #: 12 of 17    Treatment Area:  Foot pain, right [M19.171]    SUBJECTIVE  Pain Level (0-10 scale): 0/10  Any medication changes, allergies to medications, adverse drug reactions, diagnosis change, or new procedure performed?: [x] No    [] Yes (see summary sheet for update)  Subjective functional status/changes:   [] No changes reported  Pt reports that he has pain when he walks on asphalt      OBJECTIVE    Modality rationale: decrease inflammation and decrease pain to improve the patients ability to complete functional activities without pain   Min Type Additional Details    [] Estim:  []Unatt       []IFC  []Premod                        []Other:  []w/ice   []w/heat  Position:  Location:    [] Estim: []Att    []TENS instruct  []NMES                    []Other:  []w/US   []w/ice   []w/heat  Position:  Location:    []  Traction: [] Cervical       []Lumbar                       [] Prone          []Supine                       []Intermittent   []Continuous Lbs:  [] before manual  [] after manual    []  Ultrasound: []Continuous   [] Pulsed                           []1MHz   []3MHz W/cm2:  Location:    []  Iontophoresis with dexamethasone         Location: [] Take home patch   [] In clinic    []  Ice     []  heat  []  Ice massage  []  Laser   []  Anodyne Position:  Location:    []  Laser with stim  []  Other:  Position:  Location:   10 [x]  Vasopneumatic Device Pressure:       [] lo [x] med [] hi   Temperature: [] lo [x] med [] hi   [x] Skin assessment post-treatment:  [x]intact [x]redness- no adverse reaction           25 min Neuromuscular Re-education:  [x]  See flow sheet :   Rationale: increase ROM, increase strength, improve coordination, improve balance and increase proprioception  to improve the patients ability to complete functional activities safely    15 min Manual Therapy:  PROM of right ankle, myofascial work to lateral compartment of right ankle   Rationale: decrease pain, increase ROM, increase tissue extensibility and decrease edema  to complete functional activities safely          With   [] TE   [] TA   [] neuro   [] other: Patient Education: [x] Review HEP    [] Progressed/Changed HEP based on:   [] positioning   [] body mechanics   [] transfers   [] heat/ice application    [] other:          Pain Level (0-10 scale) post treatment: 0/10    ASSESSMENT/Changes in Function: Pt demonstrates continued balance issues with difficulty in narrowed stance and SLS. He demonstrates fascial restrictions in right lateral heel and mid foot limiting balance strategies in standing. Patient will continue to benefit from skilled PT services to address functional mobility deficits, address ROM deficits, address strength deficits and analyze and address soft tissue restrictions to attain remaining goals. []  See Plan of Care  []  See progress note/recertification  []  See Discharge Summary         Progress towards goals / Updated goals: Increase right ankle AROM by >/= 10 degrees all planes to normalize function. Status at eval DF: 4, PF: 32, inversion: 24, eversion: 10  Status at :DF:18, PF:13, INV:10, EV: 18  Current: no change      Increase right great toe AROM flex and ext by >/= 10 degrees each to normalize gait. Status at eval: ext: 40, flex: 25  Status at [facility]: progressing  Current: progressing,      New Goal: Increase right ankle strength >/= 4+/5 to normalize function.   Current Status: Progressing, however still challenged with balance activities and walking on asphalt surfaces    PLAN  []  Upgrade activities as tolerated     []  Continue plan of care  []  Update interventions per flow sheet       []  Discharge due to:_  []  Other:_      Esme Blanchard PTA 4/10/2017  11:51 AM    Future Appointments  Date Time Provider Cyrus Santamaria   4/13/2017 9:30 AM Rajan Manzanares PTA [id number]

## 2017-04-13 ENCOUNTER — HOSPITAL ENCOUNTER (OUTPATIENT)
Dept: PHYSICAL THERAPY | Age: 72
Discharge: HOME | End: 2017-04-13
Payer: MEDICARE

## 2017-04-13 PROCEDURE — 97110 THERAPEUTIC EXERCISES: CPT

## 2017-04-13 PROCEDURE — 97112 NEUROMUSCULAR REEDUCATION: CPT

## 2017-04-13 PROCEDURE — G8980 MOBILITY D/C STATUS: HCPCS

## 2017-04-13 PROCEDURE — G8979 MOBILITY GOAL STATUS: HCPCS

## 2017-04-13 PROCEDURE — 97016 VASOPNEUMATIC DEVICE THERAPY: CPT

## 2017-04-13 NOTE — PROGRESS NOTES
PT DAILY TREATMENT NOTE - [facility]     Patient Name: Shoshana Montenegro  Date:2017  : 1945  [x]  Patient  Verified  Payor: Jackelin Lyon / Plan: [place] MEDICARE PART A & B / Product Type: Medicare /    In time:930  Out time:  Total Treatment Time (min): 52  Total Timed Codes (min): 42  1:1 Treatment Time ( [address] only): 42   Visit #: 13 of 17    Treatment Area: Foot pain, right [M79.181]    SUBJECTIVE  Pain Level (0-10 scale): 0/10  Any medication changes, allergies to medications, adverse drug reactions, diagnosis change, or new procedure performed?: [x] No    [] Yes (see summary sheet for update)  Subjective functional status/changes:   [] No changes reported  I would like to be discharged today. The MD said it all looks good. I walked 2 half mile walks yesterday without any problem.     OBJECTIVE    Modality rationale: decrease pain to improve the patients ability to normalize gait   Min Type Additional Details    [] Estim:  []Unatt       []IFC  []Premod                        []Other:  []w/ice   []w/heat  Position:  Location:    [] Estim: []Att    []TENS instruct  []NMES                    []Other:  []w/US   []w/ice   []w/heat  Position:  Location:    []  Traction: [] Cervical       []Lumbar                       [] Prone          []Supine                       []Intermittent   []Continuous Lbs:  [] before manual  [] after manual    []  Ultrasound: []Continuous   [] Pulsed                           []1MHz   []3MHz W/cm2:  Location:    []  Iontophoresis with dexamethasone         Location: [] Take home patch   [] In clinic    []  Ice     []  heat  []  Ice massage  []  Laser   []  Anodyne Position:  Location:    []  Laser with stim  []  Other:  Position:  Location:   10 [x]  Vasopneumatic Device right foot Pressure:       [] lo [x] med [] hi   Temperature: [x] lo [] med [] hi   [x] Skin assessment post-treatment:  [x]intact []redness- no adverse reaction    []redness  adverse reaction:      min []Eval []Re-Eval       30 min Therapeutic Exercise:  [x] See flow sheet :   Rationale: increase ROM and increase strength to improve the patients ability to normalize gait and ADL's     min Therapeutic Activity:  []  See flow sheet :        12 min Neuromuscular Re-education:  [x]  See flow sheet :static and dynamic balance reviewed for discharge   Rationale: increase strength, improve coordination and improve balance  to improve the patients ability to normalize gait and balance     min Manual Therapy:          min Gait Training:  ___ feet with ___ device on level surfaces with ___ level of assist   Rationale: With   [] TE   [] TA   [] neuro   [] other: Patient Education: [x] Review HEP    [] Progressed/Changed HEP based on:   [] positioning   [] body mechanics   [] transfers   [] heat/ice application    [] other:      Other Objective/Functional Measures: see goal review Therex reviewed for discharge today. Pain Level (0-10 scale) post treatment: 0/10    ASSESSMENT/Changes in Function: Gait and function normalizing at this time with pt motivated to continue HEP and progressive walking program upon discharge. []  See Plan of Care  []  See progress note/recertification  [x]  See Discharge Summary       Progress towards goals / Updated goals: Increase right ankle AROM by >/= 10 degrees all planes to normalize function. Status at [facility] DF: 4, PF: 32, inversion: 24, eversion: 10  Status at :DF:18, PF:13, INV:10, EV: 25  Current:DF:18, PF:15, INV:28, EV: 25       Increase right great toe AROM flex and ext by >/= 10 degrees each to normalize gait. Status at [facility]: ext: 40, flex: 25  Status at [facility]: progressing  Current: ext: 45, flex: 25 (no change)      New Goal: Increase right ankle strength >/= 4+/5 to normalize function.   Current Status:DF:5/5, PF:5/5, INV:4+/5, EV: 4+/5           PLAN  []  Upgrade activities as tolerated     [x]  Continue plan of care  []  Update interventions per flow sheet [x]  Discharge due to:pt requesting discharge at this time with HEP and self monitored exercises promoted_  []  Other:_      Марина Iverson, PTA 4/13/2017  9:44 AM    No future appointments.

## 2017-04-13 NOTE — PROGRESS NOTES
[facility] at [address] [place] [address]  Phone: [phone]   Fax: [phone]    Discharge Summary    Patient name: Sue Cronin     Start of Care: 3/3/17  Referral source: Alyse Astorga DPM    : 1945  Medical/Treatment Diagnosis: Foot pain, right [M79.671]  Onset Date:17 (DOS)  Prior Hospitalization: see medical history   Provider#: [id number]  Comorbidities: HTN, depression, hearing impaired  Prior Level of Function:chronic foot pain with ADL's  Medications: Verified on Patient Summary List    Visits from Start of Care: 13    Missed Visits: 0    Increase right ankle AROM by >/= 10 degrees all planes to normalize function. Status at eval DF: 4, PF: 32, inversion: 24, eversion: 10  Status at :DF:18, PF:13, INV:10, EV: 25  Current:DF:18, PF:15, INV:28, EV: 25       Increase right great toe AROM flex and ext by >/= 10 degrees each to normalize gait. Status at [facility]: ext: 40, flex: 25  Status at [facility]: progressing  Current: ext: 45, flex: 25 (no change)      New Goal: Increase right ankle strength >/= 4+/5 to normalize function. Current Status:DF:5/5, PF:5/5, INV:4+/5, EV: 4+/5       G-Codes (GP)  Mobility    Goal  CK= 40-59%  D/C  CJ= 20-39%    The severity rating is based on clinical judgment and the FOTO score. Assessment/ Summary of Care: Gait and function normalizing at this time with pt motivated to continue HEP and progressive walking program upon discharge. Treatment has included ROM/stretching, intrinsic foot strengthening, ankle strengthening and stability, static and dynamic standing balance activities and manual techniques.     RECOMMENDATIONS:  [x]Discontinue therapy: [x]Patient has reached or is progressing toward set goals      []Patient is non-compliant or has abdicated      []Due to lack of appreciable progress towards set goals    Chano Mcgrath, PT 2017 1:20 PM